# Patient Record
Sex: MALE | Race: WHITE | NOT HISPANIC OR LATINO | Employment: FULL TIME | URBAN - METROPOLITAN AREA
[De-identification: names, ages, dates, MRNs, and addresses within clinical notes are randomized per-mention and may not be internally consistent; named-entity substitution may affect disease eponyms.]

---

## 2023-01-03 ENCOUNTER — OFFICE VISIT (OUTPATIENT)
Dept: SURGERY | Facility: CLINIC | Age: 38
End: 2023-01-03
Payer: COMMERCIAL

## 2023-01-03 VITALS — RESPIRATION RATE: 14 BRPM | BODY MASS INDEX: 28.13 KG/M2 | WEIGHT: 185.6 LBS | HEIGHT: 68 IN

## 2023-01-03 DIAGNOSIS — K64.4 ANAL SKIN TAG: Primary | ICD-10-CM

## 2023-01-03 PROCEDURE — 99202 OFFICE O/P NEW SF 15 MIN: CPT | Performed by: SURGERY

## 2023-01-03 RX ORDER — FLUTICASONE PROPIONATE 50 MCG
2 SPRAY, SUSPENSION (ML) NASAL DAILY
COMMUNITY
Start: 2022-11-20

## 2023-01-03 RX ORDER — TESTOSTERONE CYPIONATE 200 MG/ML
INJECTION, SOLUTION INTRAMUSCULAR
COMMUNITY
Start: 2022-12-26

## 2023-01-03 RX ORDER — AZELASTINE 1 MG/ML
2 SPRAY, METERED NASAL 2 TIMES DAILY
COMMUNITY
Start: 2022-10-23

## 2023-01-03 RX ORDER — DESVENLAFAXINE SUCCINATE 50 MG/1
50 TABLET, EXTENDED RELEASE ORAL DAILY
COMMUNITY
Start: 2022-12-31

## 2023-01-03 RX ORDER — TESTOSTERONE CYPIONATE 200 MG/ML
VIAL (ML) INTRAMUSCULAR
COMMUNITY
Start: 2018-04-02

## 2023-01-03 RX ORDER — ANASTROZOLE 1 MG/1
1 TABLET ORAL DAILY
COMMUNITY
Start: 2022-11-23

## 2023-01-03 RX ORDER — NEEDLES, DISPOSABLE 25GX5/8"
NEEDLE, DISPOSABLE MISCELLANEOUS
COMMUNITY
Start: 2022-12-26

## 2023-01-03 NOTE — PROGRESS NOTES
Chief Complaint:  Rectal Bleeding    Primary Care Provider: Naren Cheng PA    Referring Provider: Naren Cheng,*    History of Present Illness  Juancho Agudelo is a 37 y.o. male referred by Naren Cheng, * to have an area at his anus evaluated.  Patient has a small area of redundant skin near his anus.  He said someone looked at it recently and told him there was a small hole there and that he should get it evaluated by a doctor.  Pain at the area.  Patient says he has not had hemorrhoids.    Allergies: Patient has no known allergies.    Outpatient Medications Marked as Taking for the 1/3/23 encounter (Office Visit) with Roberto Shepard MD   Medication Sig Dispense Refill   • azelastine (ASTELIN) 0.1 % nasal spray 2 sprays 2 (Two) Times a Day.     • BD Hypodermic Needle 18G X 1\" misc DRAW TESTOSTERONE AS INSTRUCTED     • desvenlafaxine (PRISTIQ) 50 MG 24 hr tablet Take 50 mg by mouth Daily.     • fluticasone (FLONASE) 50 MCG/ACT nasal spray 2 sprays by Each Nare route Daily. Shake liquid     • Testosterone Cypionate (DEPOTESTOTERONE CYPIONATE) 200 MG/ML injection INJECT 0.75 ML IN THE MUSCLE EVERY 10 DAYS         Past Medical History:   • Rectal bleeding    Its happened a couple times over the last several years        Past Surgical History:   • WISDOM TOOTH EXTRACTION       Family History:   Family History   Problem Relation Age of Onset   • Cancer Mother         Breast and Lung   • Cancer Father         Squamous Cell Carcinoma        Social History:  Social History     Tobacco Use   • Smoking status: Never   • Smokeless tobacco: Former   • Tobacco comments:     Used to chew   Substance Use Topics   • Alcohol use: Not Currently     Alcohol/week: 5.0 - 10.0 standard drinks     Types: 5 - 10 Cans of beer per week     Comment: Havent drank in probably 6 weeks       Objective     Vital Signs:  Resp 14   Ht 172.7 cm (68\")   Wt 84.2 kg (185 lb 9.6 oz)   BMI 28.22 kg/m²   • Constitutional:  healthy appearing, alert, no acute distress, reliable historian  • HENT:  NCAT, no visible deformities or lesions  • Eyes:  sclerae clear, conjunctivae clear, EOMI  • Neck:  normal appearance, no masses, trachea midline  • Respiratory:  breathing not labored, respiratory effort appears normal  • Cardiovascular:  heart regular rate  • Abdomen:  soft, nontender, nondistended    • Skin and subcutaneous tissue:  no visible concerning rashes or lesions, no jaundice  • Musculoskeletal: moving all extremities symmetrically and purposefully  • Neurologic:  no obvious motor or sensory deficits, normal gait, able to stand without difficulty, cerebellar function without any obvious abnormalities, alert & oriented x 3, speech clear  • Psychiatric:  judgment and insight intact, mood normal, affect appropriate, cooperative  • At the anterior midline anus, there is a small benign nontender anal skin tag      Assessment:  Anal skin tag    Plan:  Diagnosis discussed with patient.  Excision of the anal skin tag offered but patient prefers not to have anything done at this time.  He will schedule appointment to see me again if he changes his mind.    Roberto Shepard MD  01/03/2023    Electronically signed by Roberto Shepard MD, 01/03/23, 8:45 AM EST.

## 2023-01-03 NOTE — LETTER
January 3, 2023     LARA Courtney  [address] [place] [address]    Patient: Juancho Agudelo   YOB: 1985   Date of Visit: 1/3/2023       Dear LARA Eugene:    Thank you for referring Juancho Agudelo to me for evaluation. Below are the relevant portions of my assessment and plan of care.    If you have questions, please do not hesitate to call me. I look forward to following Juancho along with you.         Sincerely,        Roberto Shepard MD        CC: No Recipients  Roberto Shepard MD  01/03/23 0903  Sign when Signing Visit  Chief Complaint:  Rectal Bleeding    Primary Care Provider: Naren Cheng PA    Referring Provider: Naren Cheng,*    History of Present Illness  Juancho Agudelo is a 37 y.o. male referred by Naren Cheng, * to have an area at his anus evaluated.  Patient has a small area of redundant skin near his anus.  He said someone looked at it recently and told him there was a small hole there and that he should get it evaluated by a doctor.  Pain at the area.  Patient says he has not had hemorrhoids.    Allergies: Patient has no known allergies.    Outpatient Medications Marked as Taking for the 1/3/23 encounter (Office Visit) with Roberto Shepard MD   Medication Sig Dispense Refill   • azelastine (ASTELIN) 0.1 % nasal spray 2 sprays 2 (Two) Times a Day.     • BD Hypodermic Needle 18G X 1\" misc DRAW TESTOSTERONE AS INSTRUCTED     • desvenlafaxine (PRISTIQ) 50 MG 24 hr tablet Take 50 mg by mouth Daily.     • fluticasone (FLONASE) 50 MCG/ACT nasal spray 2 sprays by Each Nare route Daily. Shake liquid     • Testosterone Cypionate (DEPOTESTOTERONE CYPIONATE) 200 MG/ML injection INJECT 0.75 ML IN THE MUSCLE EVERY 10 DAYS         Past Medical History:   • Rectal bleeding    Its happened a couple times over the last several years        Past Surgical History:   • WISDOM TOOTH EXTRACTION       Family History:   Family History   Problem Relation Age of  Onset   • Cancer Mother         Breast and Lung   • Cancer Father         Squamous Cell Carcinoma        Social History:  Social History     Tobacco Use   • Smoking status: Never   • Smokeless tobacco: Former   • Tobacco comments:     Used to chew   Substance Use Topics   • Alcohol use: Not Currently     Alcohol/week: 5.0 - 10.0 standard drinks     Types: 5 - 10 Cans of beer per week     Comment: Havent drank in probably 6 weeks       Objective      Vital Signs:  Resp 14   Ht 172.7 cm (68\")   Wt 84.2 kg (185 lb 9.6 oz)   BMI 28.22 kg/m²   • Constitutional: healthy appearing, alert, no acute distress, reliable historian  • HENT:  NCAT, no visible deformities or lesions  • Eyes:  sclerae clear, conjunctivae clear, EOMI  • Neck:  normal appearance, no masses, trachea midline  • Respiratory:  breathing not labored, respiratory effort appears normal  • Cardiovascular:  heart regular rate  • Abdomen:  soft, nontender, nondistended    • Skin and subcutaneous tissue:  no visible concerning rashes or lesions, no jaundice  • Musculoskeletal: moving all extremities symmetrically and purposefully  • Neurologic:  no obvious motor or sensory deficits, normal gait, able to stand without difficulty, cerebellar function without any obvious abnormalities, alert & oriented x 3, speech clear  • Psychiatric:  judgment and insight intact, mood normal, affect appropriate, cooperative  • At the anterior midline anus, there is a small benign nontender anal skin tag      Assessment:  Anal skin tag    Plan:  Diagnosis discussed with patient.  Excision of the anal skin tag offered but patient prefers not to have anything done at this time.  He will schedule appointment to see me again if he changes his mind.    Roberto Shepard MD  01/03/2023    Electronically signed by Roberto Shepard MD, 01/03/23, 8:45 AM EST.

## 2023-12-14 ENCOUNTER — OFFICE VISIT (OUTPATIENT)
Dept: ORTHOPEDIC SURGERY | Facility: CLINIC | Age: 38
End: 2023-12-14
Payer: COMMERCIAL

## 2023-12-14 VITALS
BODY MASS INDEX: 28.04 KG/M2 | WEIGHT: 185 LBS | OXYGEN SATURATION: 98 % | HEART RATE: 81 BPM | SYSTOLIC BLOOD PRESSURE: 156 MMHG | DIASTOLIC BLOOD PRESSURE: 88 MMHG | HEIGHT: 68 IN

## 2023-12-14 DIAGNOSIS — M25.561 RIGHT KNEE PAIN, UNSPECIFIED CHRONICITY: Primary | ICD-10-CM

## 2023-12-14 DIAGNOSIS — M22.2X1 PATELLOFEMORAL PAIN SYNDROME OF RIGHT KNEE: ICD-10-CM

## 2023-12-14 RX ORDER — MELOXICAM 15 MG/1
1 TABLET ORAL DAILY PRN
COMMUNITY
Start: 2023-12-05 | End: 2024-12-04

## 2023-12-14 RX ORDER — DICLOFENAC SODIUM 75 MG/1
75 TABLET, DELAYED RELEASE ORAL 2 TIMES DAILY
Qty: 60 TABLET | Refills: 0 | Status: SHIPPED | OUTPATIENT
Start: 2023-12-14

## 2023-12-14 NOTE — PROGRESS NOTES
"Chief Complaint  Initial Evaluation of the Right Knee     Subjective      Juancho Agudelo presents to [facility] for initial evaluation of the right knee. He has had pain in the knee for a couple of years he has disregarded.  The pain is on the lateral aspect of the knee and he notes instability.  He notes his knee gives out on him. He has had with stairs, stooping and squatting. He has had no recent injury or fall.     No Known Allergies     Social History     Socioeconomic History    Marital status:    Tobacco Use    Smoking status: Never    Smokeless tobacco: Former    Tobacco comments:     Used to chew   Vaping Use    Vaping Use: Never used   Substance and Sexual Activity    Alcohol use: Not Currently     Alcohol/week: 5.0 - 10.0 standard drinks of alcohol     Types: 5 - 10 Cans of beer per week     Comment: Havent drank in probably 6 weeks    Drug use: Never    Sexual activity: Yes     Partners: Female     Birth control/protection: Pill     Comment: Wife        I reviewed the patient's chief complaint, history of present illness, review of systems, past medical history, surgical history, family history, social history, medications, and allergy list.     Review of Systems     Constitutional: Denies fevers, chills, weight loss  Cardiovascular: Denies chest pain, shortness of breath  Skin: Denies rashes, acute skin changes  Neurologic: Denies headache, loss of consciousness        Vital Signs:   /88   Pulse 81   Ht 172.7 cm (68\")   Wt 83.9 kg (185 lb)   SpO2 98%   BMI 28.13 kg/m²          Physical Exam  General: Alert. No acute distress    Ortho Exam        RIGHT KNEE Flexion 125. Extension 0. Stable to varus/valgus stress. Stable to anterior/posterior drawer. Neurovascularly intact. Negative Kit. Negative Lachman. Positive EHL, FHL, HS and TA. Sensation intact to light touch all 5 nerves of the foot. Ambulates with Non-antalgic gait. Patella is well tracking. " Calf supple, non-tender. Negative tenderness to the medial joint line. Negative tenderness to the lateral joint line. Negative Crepitus. Good strength to hamstrings, quadriceps, dorsiflexors, and plantar flexors.  Knee Extensor Mechanism intact He is tender over the patella.       Procedures      Imaging Results (Most Recent)       Procedure Component Value Units Date/Time    XR Knee 1 or 2 View Right [[id number]] Resulted: 23     Updated: 23             Result Review :     X-Ray Report:  Right knee X-Ray  Indication: Evaluation of the right knee  AP/Lateral and Paulina view(s)  Findings: Mild arthritis.  Bi partite patella  Prior studies available for comparison: yes       XR Knee 3 Vws RT    Result Date: 2023  Narrative: This result has an attachment that is not available. REVIEWING YOUR TEST RESULTS IN [facility] IS NOT A SUBSTITUTE FOR DISCUSSING THOSE RESULTS WITH YOUR HEALTH CARE PROVIDER. PLEASE CONTACT YOUR PROVIDER [facility] TO DISCUSS ANY QUESTIONS OR CONCERNS YOU MAY HAVE REGARDING THESE TEST RESULTS.  RADIOLOGY REPORT FACILITY:  [facility] UNIT/AGE/GENDER: P.HL2  OP      AGE:38 Y          SEX:M PATIENT NAME/:  YRIS BENAVIDES LEWIS    1985 UNIT NUMBER:  [id number] ACCOUNT NUMBER:  [id number] ACCESSION NUMBER:  [id number] EXAM: 3 views right knee. DATE: 2023 HISTORY: right knee pain, initial encounter FINDINGS: There is a superolateral fracture of the patella, of indeterminate age. Has the patient experienced recent trauma. There is a small knee effusion. The distal femur/proximal tibia and fibula are intact. Joint spaces are maintained. IMPRESSION: Superolateral fracture of the patella, of indeterminate age. Associated small knee effusion. Has the patient experience recent trauma ?. Dictated by: Jim Light M.D. Images and Report reviewed and interpreted by: Jim Light M.D. <PS><Electronically signed by: Jim Light  M.D.> 12/05/2023 1650 D: 12/05/2023 1648 T: 12/05/2023 1648            Assessment and Plan     Diagnoses and all orders for this visit:    1. Right knee pain, unspecified chronicity (Primary)  -     XR Knee 1 or 2 View Right    2. Patellofemoral pain syndrome of right knee        Discussed the treatment plan with the patient. I reviewed the X-rays that were obtained 12/5/23 with the patient. I reviewed the X-rays that were obtained today with the patient.     Prescribed anti inflammatory.  HEP exercises.       Call or return if worsening symptoms.    Follow Up     PRN      Patient was given instructions and counseling regarding his condition or for health maintenance advice. Please see specific information pulled into the AVS if appropriate.     Scribed for Alcon Muñoz MD by Angella Velasquez MA.  12/14/23   08:52 EST    I have personally performed the services described in this document as scribed by the above individual and it is both accurate and complete. Alcon Muñoz MD 12/14/23

## 2024-01-17 DIAGNOSIS — M25.561 RIGHT KNEE PAIN, UNSPECIFIED CHRONICITY: ICD-10-CM

## 2024-01-17 DIAGNOSIS — M22.2X1 PATELLOFEMORAL PAIN SYNDROME OF RIGHT KNEE: ICD-10-CM

## 2024-01-17 RX ORDER — DICLOFENAC SODIUM 75 MG/1
75 TABLET, DELAYED RELEASE ORAL 2 TIMES DAILY
Qty: 60 TABLET | Refills: 0 | Status: SHIPPED | OUTPATIENT
Start: 2024-01-17

## 2024-02-14 DIAGNOSIS — M25.561 RIGHT KNEE PAIN, UNSPECIFIED CHRONICITY: ICD-10-CM

## 2024-02-14 DIAGNOSIS — M22.2X1 PATELLOFEMORAL PAIN SYNDROME OF RIGHT KNEE: ICD-10-CM

## 2024-02-14 RX ORDER — DICLOFENAC SODIUM 75 MG/1
75 TABLET, DELAYED RELEASE ORAL 2 TIMES DAILY
Qty: 60 TABLET | Refills: 0 | Status: SHIPPED | OUTPATIENT
Start: 2024-02-14

## 2024-03-15 DIAGNOSIS — M25.561 RIGHT KNEE PAIN, UNSPECIFIED CHRONICITY: ICD-10-CM

## 2024-03-15 DIAGNOSIS — M22.2X1 PATELLOFEMORAL PAIN SYNDROME OF RIGHT KNEE: ICD-10-CM

## 2024-03-15 RX ORDER — DICLOFENAC SODIUM 75 MG/1
75 TABLET, DELAYED RELEASE ORAL 2 TIMES DAILY
Qty: 60 TABLET | Refills: 0 | Status: SHIPPED | OUTPATIENT
Start: 2024-03-15

## 2025-04-02 ENCOUNTER — PATIENT ROUNDING (BHMG ONLY) (OUTPATIENT)
Dept: URGENT CARE | Facility: CLINIC | Age: 40
End: 2025-04-02
Payer: COMMERCIAL

## 2025-04-02 NOTE — ED NOTES
Thank you for letting us care for you in your recent visit to our urgent care center. We would love to hear about your experience with us. Was this the first time you have visited our location?    We're always looking for ways to make our patients' experiences even better. Do you have any recommendations on ways we may improve?     I appreciate you taking the time to respond. Please be on the lookout for a survey about your recent visit from [employer] via text or email. We would greatly appreciate if you could fill that out and turn it back in. We want your voice to be heard and we value your feedback.   Thank you for choosing [facility] for your healthcare needs.